# Patient Record
Sex: FEMALE | Race: WHITE
[De-identification: names, ages, dates, MRNs, and addresses within clinical notes are randomized per-mention and may not be internally consistent; named-entity substitution may affect disease eponyms.]

---

## 2020-04-06 ENCOUNTER — HOSPITAL ENCOUNTER (EMERGENCY)
Dept: HOSPITAL 35 - ER | Age: 25
LOS: 1 days | Discharge: HOME | End: 2020-04-07
Payer: COMMERCIAL

## 2020-04-06 VITALS — HEIGHT: 64 IN | WEIGHT: 120 LBS | BODY MASS INDEX: 20.49 KG/M2

## 2020-04-06 VITALS — DIASTOLIC BLOOD PRESSURE: 65 MMHG | SYSTOLIC BLOOD PRESSURE: 110 MMHG

## 2020-04-06 DIAGNOSIS — F17.210: ICD-10-CM

## 2020-04-06 DIAGNOSIS — N93.9: Primary | ICD-10-CM

## 2020-04-06 DIAGNOSIS — R10.84: ICD-10-CM

## 2020-04-06 DIAGNOSIS — Z88.1: ICD-10-CM

## 2020-04-06 DIAGNOSIS — R10.2: ICD-10-CM

## 2020-04-06 LAB
ANION GAP SERPL CALC-SCNC: 7 MMOL/L (ref 7–16)
BASOPHILS NFR BLD AUTO: 0.5 % (ref 0–2)
BILIRUB UR-MCNC: NEGATIVE MG/DL
BUN SERPL-MCNC: 11 MG/DL (ref 7–18)
CALCIUM SERPL-MCNC: 8.7 MG/DL (ref 8.5–10.1)
CHLORIDE SERPL-SCNC: 102 MMOL/L (ref 98–107)
CO2 SERPL-SCNC: 28 MMOL/L (ref 21–32)
COLOR UR: YELLOW
CREAT SERPL-MCNC: 0.9 MG/DL (ref 0.6–1)
EOSINOPHIL NFR BLD: 1.2 % (ref 0–3)
ERYTHROCYTE [DISTWIDTH] IN BLOOD BY AUTOMATED COUNT: 16.1 % (ref 10.5–14.5)
GLUCOSE SERPL-MCNC: 103 MG/DL (ref 74–106)
GRANULOCYTES NFR BLD MANUAL: 57.1 % (ref 36–66)
HCT VFR BLD CALC: 35.4 % (ref 37–47)
HGB BLD-MCNC: 11.6 GM/DL (ref 12–15)
KETONES UR STRIP-MCNC: NEGATIVE MG/DL
LYMPHOCYTES NFR BLD AUTO: 35.7 % (ref 24–44)
MCH RBC QN AUTO: 28 PG (ref 26–34)
MCHC RBC AUTO-ENTMCNC: 32.7 G/DL (ref 28–37)
MCV RBC: 85.4 FL (ref 80–100)
MONOCYTES NFR BLD: 5.5 % (ref 1–8)
NEUTROPHILS # BLD: 4.1 THOU/UL (ref 1.4–8.2)
PLATELET # BLD: 244 THOU/UL (ref 150–400)
POTASSIUM SERPL-SCNC: 3.5 MMOL/L (ref 3.5–5.1)
RBC # BLD AUTO: 4.14 MIL/UL (ref 4.2–5)
RBC # UR STRIP: NEGATIVE /UL
SODIUM SERPL-SCNC: 137 MMOL/L (ref 136–145)
SP GR UR STRIP: >= 1.03 (ref 1–1.03)
URINE CLARITY: CLEAR
URINE GLUCOSE-RANDOM*: NEGATIVE
URINE LEUKOCYTES-REFLEX: NEGATIVE
URINE NITRITE-REFLEX: NEGATIVE
URINE PROTEIN (DIPSTICK): NEGATIVE
UROBILINOGEN UR STRIP-ACNC: 0.2 E.U./DL (ref 0.2–1)
WBC # BLD AUTO: 7.2 THOU/UL (ref 4–11)

## 2020-04-23 ENCOUNTER — HOSPITAL ENCOUNTER (EMERGENCY)
Dept: HOSPITAL 35 - ER | Age: 25
LOS: 1 days | Discharge: HOME | End: 2020-04-24
Payer: COMMERCIAL

## 2020-04-23 VITALS — BODY MASS INDEX: 20.49 KG/M2 | HEIGHT: 64 IN | WEIGHT: 120 LBS

## 2020-04-23 DIAGNOSIS — R05: Primary | ICD-10-CM

## 2020-04-23 DIAGNOSIS — Z79.899: ICD-10-CM

## 2020-04-23 DIAGNOSIS — F17.200: ICD-10-CM

## 2020-04-23 DIAGNOSIS — Z88.1: ICD-10-CM

## 2020-04-23 DIAGNOSIS — R19.7: ICD-10-CM

## 2020-04-23 DIAGNOSIS — R06.02: ICD-10-CM

## 2020-04-23 DIAGNOSIS — Z03.818: ICD-10-CM

## 2020-04-23 LAB
ALBUMIN SERPL-MCNC: 4.5 G/DL (ref 3.4–5)
ALT SERPL-CCNC: 22 U/L (ref 30–65)
ANION GAP SERPL CALC-SCNC: 10 MMOL/L (ref 7–16)
AST SERPL-CCNC: 18 U/L (ref 15–37)
BASOPHILS NFR BLD AUTO: 0.2 % (ref 0–2)
BILIRUB SERPL-MCNC: 0.4 MG/DL
BUN SERPL-MCNC: 13 MG/DL (ref 7–18)
CALCIUM SERPL-MCNC: 9 MG/DL (ref 8.5–10.1)
CHLORIDE SERPL-SCNC: 102 MMOL/L (ref 98–107)
CO2 SERPL-SCNC: 27 MMOL/L (ref 21–32)
CREAT SERPL-MCNC: 0.7 MG/DL (ref 0.6–1)
EOSINOPHIL NFR BLD: 0.2 % (ref 0–3)
ERYTHROCYTE [DISTWIDTH] IN BLOOD BY AUTOMATED COUNT: 17 % (ref 10.5–14.5)
GLUCOSE SERPL-MCNC: 86 MG/DL (ref 74–106)
GRANULOCYTES NFR BLD MANUAL: 68.1 % (ref 36–66)
HCT VFR BLD CALC: 38.8 % (ref 37–47)
HGB BLD-MCNC: 12.7 GM/DL (ref 12–15)
LYMPHOCYTES NFR BLD AUTO: 25.2 % (ref 24–44)
MCH RBC QN AUTO: 28.2 PG (ref 26–34)
MCHC RBC AUTO-ENTMCNC: 32.9 G/DL (ref 28–37)
MCV RBC: 85.7 FL (ref 80–100)
MONOCYTES NFR BLD: 6.3 % (ref 1–8)
NEUTROPHILS # BLD: 5.9 THOU/UL (ref 1.4–8.2)
PLATELET # BLD: 271 THOU/UL (ref 150–400)
POTASSIUM SERPL-SCNC: 3.7 MMOL/L (ref 3.5–5.1)
PROT SERPL-MCNC: 8.4 G/DL (ref 6.4–8.2)
RBC # BLD AUTO: 4.52 MIL/UL (ref 4.2–5)
SODIUM SERPL-SCNC: 139 MMOL/L (ref 136–145)
WBC # BLD AUTO: 8.6 THOU/UL (ref 4–11)

## 2020-04-24 VITALS — SYSTOLIC BLOOD PRESSURE: 109 MMHG | DIASTOLIC BLOOD PRESSURE: 80 MMHG

## 2020-04-24 LAB
BILIRUB UR-MCNC: (no result) MG/DL
COLOR UR: YELLOW
KETONES UR STRIP-MCNC: (no result) MG/DL
RBC # UR STRIP: NEGATIVE /UL
SP GR UR STRIP: 1.02 (ref 1–1.03)
URINE CLARITY: CLEAR
URINE GLUCOSE-RANDOM*: NEGATIVE
URINE LEUKOCYTES-REFLEX: NEGATIVE
URINE NITRITE-REFLEX: NEGATIVE
URINE PROTEIN (DIPSTICK): NEGATIVE
UROBILINOGEN UR STRIP-ACNC: 1 E.U./DL (ref 0.2–1)

## 2020-10-05 ENCOUNTER — HOSPITAL ENCOUNTER (EMERGENCY)
Dept: HOSPITAL 35 - ER | Age: 25
LOS: 1 days | Discharge: LEFT BEFORE DISCHARGE | End: 2020-10-06
Payer: COMMERCIAL

## 2020-10-05 VITALS — SYSTOLIC BLOOD PRESSURE: 104 MMHG | DIASTOLIC BLOOD PRESSURE: 62 MMHG

## 2020-10-05 VITALS — WEIGHT: 114.99 LBS | BODY MASS INDEX: 19.63 KG/M2 | HEIGHT: 64 IN

## 2020-10-05 DIAGNOSIS — R10.9: Primary | ICD-10-CM

## 2020-10-05 DIAGNOSIS — Z53.21: ICD-10-CM

## 2020-10-05 DIAGNOSIS — R10.2: ICD-10-CM

## 2020-10-05 DIAGNOSIS — R51.9: ICD-10-CM

## 2020-10-05 DIAGNOSIS — R11.10: ICD-10-CM

## 2020-10-06 ENCOUNTER — HOSPITAL ENCOUNTER (EMERGENCY)
Dept: HOSPITAL 35 - ER | Age: 25
Discharge: HOME | End: 2020-10-06
Payer: COMMERCIAL

## 2020-10-06 VITALS — BODY MASS INDEX: 20.49 KG/M2 | WEIGHT: 120 LBS | HEIGHT: 64 IN

## 2020-10-06 VITALS — DIASTOLIC BLOOD PRESSURE: 60 MMHG | SYSTOLIC BLOOD PRESSURE: 90 MMHG

## 2020-10-06 DIAGNOSIS — Z88.5: ICD-10-CM

## 2020-10-06 DIAGNOSIS — K59.00: Primary | ICD-10-CM

## 2020-10-06 DIAGNOSIS — N73.9: ICD-10-CM

## 2020-10-06 DIAGNOSIS — Z79.899: ICD-10-CM

## 2020-10-06 DIAGNOSIS — Z88.1: ICD-10-CM

## 2020-10-06 LAB
ALBUMIN SERPL-MCNC: 3.5 G/DL (ref 3.4–5)
ALT SERPL-CCNC: 13 U/L (ref 30–65)
ANION GAP SERPL CALC-SCNC: 9 MMOL/L (ref 7–16)
AST SERPL-CCNC: 29 U/L (ref 15–37)
BASOPHILS NFR BLD AUTO: 0.5 % (ref 0–2)
BILIRUB SERPL-MCNC: 0.3 MG/DL (ref 0.2–1)
BILIRUB UR-MCNC: NEGATIVE MG/DL
BUN SERPL-MCNC: 8 MG/DL (ref 7–18)
CALCIUM SERPL-MCNC: 9.1 MG/DL (ref 8.5–10.1)
CHLORIDE SERPL-SCNC: 103 MMOL/L (ref 98–107)
CO2 SERPL-SCNC: 26 MMOL/L (ref 21–32)
COLOR UR: YELLOW
CREAT SERPL-MCNC: 0.7 MG/DL (ref 0.6–1)
EOSINOPHIL NFR BLD: 1.7 % (ref 0–3)
ERYTHROCYTE [DISTWIDTH] IN BLOOD BY AUTOMATED COUNT: 15.8 % (ref 10.5–14.5)
GLUCOSE SERPL-MCNC: 93 MG/DL (ref 74–106)
GRANULOCYTES NFR BLD MANUAL: 55.4 % (ref 36–66)
HCT VFR BLD CALC: 36.2 % (ref 37–47)
HGB BLD-MCNC: 11.4 GM/DL (ref 12–15)
KETONES UR STRIP-MCNC: NEGATIVE MG/DL
LIPASE: 82 U/L (ref 73–393)
LYMPHOCYTES NFR BLD AUTO: 33 % (ref 24–44)
MCH RBC QN AUTO: 24.5 PG (ref 26–34)
MCHC RBC AUTO-ENTMCNC: 31.6 G/DL (ref 28–37)
MCV RBC: 77.3 FL (ref 80–100)
MONOCYTES NFR BLD: 9.4 % (ref 1–8)
NEUTROPHILS # BLD: 3.9 THOU/UL (ref 1.4–8.2)
PLATELET # BLD: 193 THOU/UL (ref 150–400)
POTASSIUM SERPL-SCNC: 4.4 MMOL/L (ref 3.5–5.1)
PROT SERPL-MCNC: 7.9 G/DL (ref 6.4–8.2)
RBC # BLD AUTO: 4.68 MIL/UL (ref 4.2–5)
RBC # UR STRIP: NEGATIVE /UL
SODIUM SERPL-SCNC: 138 MMOL/L (ref 136–145)
SP GR UR STRIP: >= 1.03 (ref 1–1.03)
URINE CLARITY: CLEAR
URINE GLUCOSE-RANDOM*: NEGATIVE
URINE LEUKOCYTES-REFLEX: NEGATIVE
URINE NITRITE-REFLEX: NEGATIVE
URINE PROTEIN (DIPSTICK): NEGATIVE
UROBILINOGEN UR STRIP-ACNC: 0.2 E.U./DL (ref 0.2–1)
WBC # BLD AUTO: 7 THOU/UL (ref 4–11)

## 2021-02-07 ENCOUNTER — HOSPITAL ENCOUNTER (EMERGENCY)
Dept: HOSPITAL 63 - ER | Age: 26
Discharge: HOME | End: 2021-02-07
Payer: COMMERCIAL

## 2021-02-07 VITALS
BODY MASS INDEX: 23.11 KG/M2 | WEIGHT: 135.36 LBS | HEIGHT: 64 IN | DIASTOLIC BLOOD PRESSURE: 56 MMHG | SYSTOLIC BLOOD PRESSURE: 107 MMHG

## 2021-02-07 DIAGNOSIS — K08.89: Primary | ICD-10-CM

## 2021-02-07 PROCEDURE — 99283 EMERGENCY DEPT VISIT LOW MDM: CPT

## 2021-02-07 NOTE — PHYS DOC
Adult General


Chief Complaint


Chief Complaint:  DENTAL PROBLEM





HPI


HPI


Patient is a 25-year-old female, with a past medical history of poor dentition 

who has an appointment coming up in 2 weeks for tooth extraction who presents 

with a chief complaint of dental pain.  States he has had trouble with this too

th for a year now, and pain now is 6 out of 10, sharp in nature.  Denies fevers,

chest pain, shortness of breath, nausea, vomiting.





Review of Systems


Review of Systems


Review of systems otherwise unremarkable except noted in HPI





Physical Exam


Physical Exam





Constitutional: Well developed, well nourished, no acute distress, non-toxic 

appearance. []


HENT: Normocephalic, atraumatic, patient with bottom left molar pain on 

palpation with mild redness and tenderness around the gumline.


Neck: Normal range of motion, no tenderness, supple, no stridor. [] 


Neurologic: Alert and oriented X 3, normal motor function, normal sensory 

function, no focal deficits noted. []


Psychologic: Affect normal, judgement normal, mood normal. []





EKG


EKG


[]





Radiology/Procedures


Radiology/Procedures


[]





Heart Score


Risk Factors:


Risk Factors:  DM, Current or recent (<one month) smoker, HTN, HLP, family 

history of CAD, obesity.


Risk Scores:


Risk Factors:  DM, Current or recent (<one month) smoker, HTN, HLP, family 

history of CAD, obesity.





Course & Med Decision Making


Course & Med Decision Making


Patient is a 25-year-old female who presents with dental pain for a year


Vital signs not concerning.  Physical exam noted above.  Offered patient dental 

block, antibiotics and pain medications.  Patient politely declined dental block

 saying she did not like needles.


Gave patient emergency dentist contact information advised to call first thing 

in the morning as she needs her tooth extracted.  Also advised that she could 

call her dentist and see if she get her appointment moved up.  Started on 

antibiotics in the ED.  Gave pain regimen to follow-up.


Patient grateful, verbalized understanding and agreed with plan of discharge.





[]





Dragon Disclaimer


Dragon Disclaimer


This electronic medical record was generated, in whole or in part, using a voice

 recognition dictation system.





Departure


Departure:


Disposition:  01 DC HOME SELF CARE/HOMELESS


Condition:  GOOD


Referrals:  


PCP,UNKNOWN (PCP)


Patient Instructions:  Dental Caries, Dental Pain





Additional Instructions:  


Please read all the attached information.  Please take your antibiotics as 

prescribed.  Please begin a regimen of ibuprofen, and Orajel as discussed.  

Please use your pain medicine only for breakthrough pain as needed.


Please call the emergency dentist at the contact information given to you first 

thing in the morning to set up an appointment or call your dentist to see if you

 can get your appointment moved up for tooth extraction.  Please come back to 

the ED with new or concerning symptoms.


Scripts


Hydrocodone Bit/Acetaminophen (HYDROCODONE-APAP 5-325  **) 1 Each Tablet


1 TAB PO TID PRN PRN for PAIN for 3 Days, #9 TAB 0 Refills


   Prov: GATITO QUINONEZ MD         2/7/21 


Amoxicillin/Potassium Clav (AUGMENTIN 875-125 TABLET) 1 Each Tablet


1 TAB PO BID for dental infection for 10 Days, #19 TAB 0 Refills


   Prov: GATITO QUINONEZ MD         2/7/21











GATITO QUINONEZ MD                Feb 7, 2021 23:31